# Patient Record
Sex: FEMALE | Race: OTHER | HISPANIC OR LATINO | URBAN - METROPOLITAN AREA
[De-identification: names, ages, dates, MRNs, and addresses within clinical notes are randomized per-mention and may not be internally consistent; named-entity substitution may affect disease eponyms.]

---

## 2018-10-15 PROBLEM — Z00.00 ENCOUNTER FOR PREVENTIVE HEALTH EXAMINATION: Status: ACTIVE | Noted: 2018-10-15

## 2018-10-16 ENCOUNTER — OUTPATIENT (OUTPATIENT)
Dept: OUTPATIENT SERVICES | Facility: HOSPITAL | Age: 23
LOS: 1 days | End: 2018-10-16
Payer: COMMERCIAL

## 2018-10-16 ENCOUNTER — APPOINTMENT (OUTPATIENT)
Dept: SLEEP CENTER | Facility: HOSPITAL | Age: 23
End: 2018-10-16

## 2018-10-16 DIAGNOSIS — G47.33 OBSTRUCTIVE SLEEP APNEA (ADULT) (PEDIATRIC): ICD-10-CM

## 2018-10-16 PROCEDURE — 95810 POLYSOM 6/> YRS 4/> PARAM: CPT | Mod: 26

## 2018-10-16 PROCEDURE — 95810 POLYSOM 6/> YRS 4/> PARAM: CPT

## 2022-07-05 ENCOUNTER — EMERGENCY (EMERGENCY)
Facility: HOSPITAL | Age: 27
LOS: 0 days | Discharge: HOME | End: 2022-07-06
Attending: EMERGENCY MEDICINE | Admitting: EMERGENCY MEDICINE

## 2022-07-05 VITALS
SYSTOLIC BLOOD PRESSURE: 111 MMHG | WEIGHT: 263.89 LBS | OXYGEN SATURATION: 99 % | HEART RATE: 88 BPM | DIASTOLIC BLOOD PRESSURE: 58 MMHG | TEMPERATURE: 98 F | RESPIRATION RATE: 18 BRPM

## 2022-07-05 DIAGNOSIS — B34.9 VIRAL INFECTION, UNSPECIFIED: ICD-10-CM

## 2022-07-05 DIAGNOSIS — Z20.822 CONTACT WITH AND (SUSPECTED) EXPOSURE TO COVID-19: ICD-10-CM

## 2022-07-05 DIAGNOSIS — R51.9 HEADACHE, UNSPECIFIED: ICD-10-CM

## 2022-07-05 PROCEDURE — 99284 EMERGENCY DEPT VISIT MOD MDM: CPT

## 2022-07-05 RX ORDER — ACETAMINOPHEN 500 MG
650 TABLET ORAL ONCE
Refills: 0 | Status: COMPLETED | OUTPATIENT
Start: 2022-07-05 | End: 2022-07-05

## 2022-07-05 RX ORDER — ACETAMINOPHEN 500 MG
975 TABLET ORAL ONCE
Refills: 0 | Status: COMPLETED | OUTPATIENT
Start: 2022-07-05 | End: 2022-07-05

## 2022-07-05 RX ADMIN — Medication 975 MILLIGRAM(S): at 23:10

## 2022-07-05 NOTE — ED PROVIDER NOTE - CLINICAL SUMMARY MEDICAL DECISION MAKING FREE TEXT BOX
26-year-old female presents ED for viral symptoms concerning for COVID.  Patient's vitals within normal limits.  Patient swabbed for COVID results pending DC home strict return precautions.

## 2022-07-05 NOTE — ED PROVIDER NOTE - NS ED ROS FT
Review of Systems:  	•	CONSTITUTIONAL - no fever, no diaphoresis, no chills  	•	SKIN - no rash  	•	HEMATOLOGIC - no bleeding, no bruising  	•	EYES - no eye pain, no blurry vision  	•	ENT - +rhinorrhea, no congestion  	•	RESPIRATORY - no shortness of breath, no cough  	•	CARDIAC - no chest pain, no palpitations  	•	GI - no abd pain, no nausea, no vomiting, no diarrhea, no constipation  	•	GENITO-URINARY - no dysuria; no hematuria, no increased urinary frequency  	•	MUSCULOSKELETAL - no joint paint, no swelling, no redness  	•	NEUROLOGIC - no weakness, + headache, no paresthesias, no LOC  	•	PSYCH - no anxiety, no depression  	All other ROS are negative except as documented in HPI.

## 2022-07-05 NOTE — ED PROVIDER NOTE - NS ED ATTENDING STATEMENT MOD
This was a shared visit with the WALLY. I reviewed and verified the documentation and independently performed the documented:

## 2022-07-05 NOTE — ED PROVIDER NOTE - OBJECTIVE STATEMENT
25 yo F presenting for evaluation of 1 day of headache, rhinorrhea, and not feeling well. States her  just got tested positive for COVID. No cp, sob, fever, chills, abdominal pain, nausea, vomiting, diarrhea, back pain, urinary symptoms, dizziness, paresthesias, or weakness.

## 2022-07-05 NOTE — ED PROVIDER NOTE - PATIENT PORTAL LINK FT
You can access the FollowMyHealth Patient Portal offered by [facility] by registering at the following website: [email or web address]. By joining [employer]’s FollowMyHealth portal, you will also be able to view your health information using other applications (apps) compatible with our system.

## 2022-07-05 NOTE — ED ADULT TRIAGE NOTE - CHIEF COMPLAINT QUOTE
pt sts I was exposed to someone with covid and now I have stuffy nose and I feel like a have a fever.

## 2022-07-05 NOTE — ED PROVIDER NOTE - ATTENDING APP SHARED VISIT CONTRIBUTION OF CARE
26-year-old female presents to ED for 1 day of viral symptoms.  Patient states she has cough and rhinorrhea.  Patient's  is positive for COVID.  Patient COVID vaccinated.  No shortness of breath no chest pain or nausea vomiting diarrhea.    Const: NAD  Eyes: PERRL, no conjunctival injection  HENT:  Neck supple without meningismus   CV: RRR, Warm, well-perfused extremities  RESP: CTA B/L, no tachypnea   GI: soft, non-tender, non-distended  MSK: No gross deformities appreciated  Skin: Warm, dry. No rashes  Neuro: Alert, CNs II-XII grossly intact. Sensation and motor function of extremities grossly intact.  Psych: Appropriate mood and affect.    will swab for COVID and give tylenol

## 2022-07-06 LAB — SARS-COV-2 RNA SPEC QL NAA+PROBE: SIGNIFICANT CHANGE UP

## 2022-07-06 NOTE — ED ADULT NURSE NOTE - NSFALLRSKPASTHIST_ED_ALL_ED
H&P reviewed. The patient was examined and there are no changes to the H&P.        
  H&P reviewed. The patient was examined and there are no changes to the H&P.        
no

## 2022-07-09 ENCOUNTER — EMERGENCY (EMERGENCY)
Facility: HOSPITAL | Age: 27
LOS: 0 days | Discharge: HOME | End: 2022-07-09
Attending: STUDENT IN AN ORGANIZED HEALTH CARE EDUCATION/TRAINING PROGRAM | Admitting: STUDENT IN AN ORGANIZED HEALTH CARE EDUCATION/TRAINING PROGRAM

## 2022-07-09 ENCOUNTER — TRANSCRIPTION ENCOUNTER (OUTPATIENT)
Age: 27
End: 2022-07-09

## 2022-07-09 VITALS
HEIGHT: 63 IN | RESPIRATION RATE: 18 BRPM | TEMPERATURE: 98 F | SYSTOLIC BLOOD PRESSURE: 109 MMHG | OXYGEN SATURATION: 99 % | DIASTOLIC BLOOD PRESSURE: 65 MMHG | WEIGHT: 263.89 LBS | HEART RATE: 90 BPM

## 2022-07-09 DIAGNOSIS — Z3A.15 15 WEEKS GESTATION OF PREGNANCY: ICD-10-CM

## 2022-07-09 DIAGNOSIS — R05.8 OTHER SPECIFIED COUGH: ICD-10-CM

## 2022-07-09 DIAGNOSIS — O30.002 TWIN PREGNANCY, UNSPECIFIED NUMBER OF PLACENTA AND UNSPECIFIED NUMBER OF AMNIOTIC SACS, SECOND TRIMESTER: ICD-10-CM

## 2022-07-09 DIAGNOSIS — J02.9 ACUTE PHARYNGITIS, UNSPECIFIED: ICD-10-CM

## 2022-07-09 DIAGNOSIS — O98.512 OTHER VIRAL DISEASES COMPLICATING PREGNANCY, SECOND TRIMESTER: ICD-10-CM

## 2022-07-09 DIAGNOSIS — O99.891 OTHER SPECIFIED DISEASES AND CONDITIONS COMPLICATING PREGNANCY: ICD-10-CM

## 2022-07-09 DIAGNOSIS — U07.1 COVID-19: ICD-10-CM

## 2022-07-09 LAB — SARS-COV-2 RNA SPEC QL NAA+PROBE: DETECTED

## 2022-07-09 PROCEDURE — 99284 EMERGENCY DEPT VISIT MOD MDM: CPT

## 2022-07-09 RX ORDER — ONDANSETRON 8 MG/1
4 TABLET, FILM COATED ORAL ONCE
Refills: 0 | Status: COMPLETED | OUTPATIENT
Start: 2022-07-09 | End: 2022-07-09

## 2022-07-09 RX ORDER — ACETAMINOPHEN 500 MG
975 TABLET ORAL EVERY 6 HOURS
Refills: 0 | Status: DISCONTINUED | OUTPATIENT
Start: 2022-07-09 | End: 2022-07-09

## 2022-07-09 RX ORDER — ACETAMINOPHEN 500 MG
975 TABLET ORAL ONCE
Refills: 0 | Status: COMPLETED | OUTPATIENT
Start: 2022-07-09 | End: 2022-07-09

## 2022-07-09 RX ADMIN — Medication 975 MILLIGRAM(S): at 08:38

## 2022-07-09 RX ADMIN — Medication 975 MILLIGRAM(S): at 09:37

## 2022-07-09 RX ADMIN — ONDANSETRON 4 MILLIGRAM(S): 8 TABLET, FILM COATED ORAL at 08:39

## 2022-07-09 NOTE — ED PROVIDER NOTE - PATIENT PORTAL LINK FT
You can access the FollowMyHealth Patient Portal offered by [facility] by registering at the following website: [email or web address]. By joining [employer]’s FollowMyHealth portal, you will also be able to view your health information using other applications (apps) compatible with our system.

## 2022-07-09 NOTE — ED PROVIDER NOTE - DOMESTIC TRAVEL HIGH RISK QUESTION
Quality 226: Preventive Care And Screening: Tobacco Use: Screening And Cessation Intervention: Patient screened for tobacco use and is an ex/non-smoker Quality 130: Documentation Of Current Medications In The Medical Record: Current Medications Documented Detail Level: Detailed Quality 431: Preventive Care And Screening: Unhealthy Alcohol Use - Screening: Patient screened for unhealthy alcohol use using a single question and scores less than 2 times per year No

## 2022-07-09 NOTE — ED PROVIDER NOTE - PHYSICAL EXAMINATION
CONSTITUTIONAL: Well-appearing; well-nourished; in no apparent distress.   EYES: PERRL; EOM intact.   NECK: Supple; non-tender; no cervical lymphadenopathy.   CARDIOVASCULAR: Normal S1, S2; no murmurs, rubs, or gallops.   RESPIRATORY: Normal chest excursion with respiration; breath sounds clear and equal bilaterally; no wheezes, rhonchi, or rales.  GI/: Normal bowel sounds; non-distended; non-tender; no palpable organomegaly.   MS: No evidence of trauma or deformity.  Normal ROM in all four extremities; non-tender to palpation; distal pulses are normal.   SKIN: Normal for age and race; warm; dry; good turgor; no apparent lesions or exudate.   NEURO/PSYCH: A & O x 4; grossly unremarkable. mood and manner are appropriate.

## 2022-07-09 NOTE — ED PROVIDER NOTE - NS ED ROS FT
Constitutional: + body aches and weakness. no fever, chills  Eyes: no redness/discharge/pain/vision changes  ENT: + sore throat, nasal congestion, sinus pressure  Cardiac: No chest pain, SOB or edema.  Respiratory: + cough.  respiratory distress  GI: + nausea. No vomiting, diarrhea or abdominal pain.  : No dysuria, frequency, urgency or hematuria  MS: no pain to back or extremities, no loss of ROM, no weakness  Neuro: No headache or weakness. No LOC.  Skin: No skin rash.  Endocrine: No history of thyroid disease or diabetes.  Except as documented in the HPI, all other systems are negative.

## 2022-07-09 NOTE — ED PROVIDER NOTE - OBJECTIVE STATEMENT
26 year old F 15 weeks pregnant with twins c/o 1 day of dry cough, nasal congestion, sinus pressure, sore throat and nausea. Pts  recently tested covid +  Pt had neg covid test 07/05 in ed. Pt denies any fever, chest pain, sob, abd pain, vomiting, inability to tolerate po, had, diarrhea, vaginal bleeding, urinary symptoms.

## 2022-07-09 NOTE — ED ADULT NURSE NOTE - OBJECTIVE STATEMENT
Pt states she is 15 weeks pregnant. C/o body aches, dizziness, nausea, stuffy nose. Pt also states "my head hurts hurt and my eyes feel really hot"  Patient states  is COVID positive

## 2022-07-09 NOTE — ED PROVIDER NOTE - CLINICAL SUMMARY MEDICAL DECISION MAKING FREE TEXT BOX
.    26-year-old pregnant female at about 15 weeks p/w cough, nasal congestion, sore throat, nausea x2 to 3 days.  + Sick contact  with COVID.  No fever, chest pain shortness of breath, abdominal pain.  No vaginal bleeding, urinary symptoms or abdominal pain.    ROS PE above patient is well-appearing, no respiratory distress, lungs clear bilaterally.    Ultrasound shows positive IUP, .  COVID swab sent.  Impression viral illness, likely COVID 19.  Pt stable for dc w/ continued oupt w/up, pmd f/up, and care as discussed.  Pt understands plan and signs and symptoms for ED return. DC home.      .

## 2022-07-09 NOTE — ED ADULT TRIAGE NOTE - CHIEF COMPLAINT QUOTE
Im 15 weeks pregnant and I feel very sick, my body hurts, I feels dizzy, nauseous, my nose is stuffy, my head hurts hurt and my eyes feel really hot -   Patient's  is COVID positive, tested negative PCR three days prior, and negative rapid yesterday

## 2022-08-15 ENCOUNTER — OUTPATIENT (OUTPATIENT)
Dept: OUTPATIENT SERVICES | Facility: HOSPITAL | Age: 27
LOS: 1 days | Discharge: HOME | End: 2022-08-15

## 2022-08-31 ENCOUNTER — EMERGENCY (EMERGENCY)
Facility: HOSPITAL | Age: 27
LOS: 0 days | Discharge: HOME | End: 2022-08-31
Attending: EMERGENCY MEDICINE | Admitting: EMERGENCY MEDICINE

## 2022-08-31 VITALS
OXYGEN SATURATION: 98 % | DIASTOLIC BLOOD PRESSURE: 52 MMHG | WEIGHT: 263.89 LBS | SYSTOLIC BLOOD PRESSURE: 107 MMHG | HEART RATE: 70 BPM | HEIGHT: 63 IN | TEMPERATURE: 98 F | RESPIRATION RATE: 22 BRPM

## 2022-08-31 DIAGNOSIS — R42 DIZZINESS AND GIDDINESS: ICD-10-CM

## 2022-08-31 DIAGNOSIS — O30.002 TWIN PREGNANCY, UNSPECIFIED NUMBER OF PLACENTA AND UNSPECIFIED NUMBER OF AMNIOTIC SACS, SECOND TRIMESTER: ICD-10-CM

## 2022-08-31 DIAGNOSIS — Z3A.00 WEEKS OF GESTATION OF PREGNANCY NOT SPECIFIED: ICD-10-CM

## 2022-08-31 DIAGNOSIS — O21.9 VOMITING OF PREGNANCY, UNSPECIFIED: ICD-10-CM

## 2022-08-31 DIAGNOSIS — R11.0 NAUSEA: ICD-10-CM

## 2022-08-31 DIAGNOSIS — R53.83 OTHER FATIGUE: ICD-10-CM

## 2022-08-31 DIAGNOSIS — O99.891 OTHER SPECIFIED DISEASES AND CONDITIONS COMPLICATING PREGNANCY: ICD-10-CM

## 2022-08-31 LAB
ALBUMIN SERPL ELPH-MCNC: 3.5 G/DL — SIGNIFICANT CHANGE UP (ref 3.5–5.2)
ALP SERPL-CCNC: 72 U/L — SIGNIFICANT CHANGE UP (ref 30–115)
ALT FLD-CCNC: 23 U/L — SIGNIFICANT CHANGE UP (ref 0–41)
ANION GAP SERPL CALC-SCNC: 9 MMOL/L — SIGNIFICANT CHANGE UP (ref 7–14)
APPEARANCE UR: ABNORMAL
APPEARANCE UR: ABNORMAL
AST SERPL-CCNC: 27 U/L — SIGNIFICANT CHANGE UP (ref 0–41)
BACTERIA # UR AUTO: ABNORMAL
BACTERIA # UR AUTO: ABNORMAL
BASOPHILS # BLD AUTO: 0.03 K/UL — SIGNIFICANT CHANGE UP (ref 0–0.2)
BASOPHILS NFR BLD AUTO: 0.4 % — SIGNIFICANT CHANGE UP (ref 0–1)
BILIRUB DIRECT SERPL-MCNC: <0.2 MG/DL — SIGNIFICANT CHANGE UP (ref 0–0.3)
BILIRUB INDIRECT FLD-MCNC: SIGNIFICANT CHANGE UP MG/DL (ref 0.2–1.2)
BILIRUB SERPL-MCNC: <0.2 MG/DL — SIGNIFICANT CHANGE UP (ref 0.2–1.2)
BILIRUB SERPL-MCNC: <0.2 MG/DL — SIGNIFICANT CHANGE UP (ref 0.2–1.2)
BILIRUB UR-MCNC: NEGATIVE — SIGNIFICANT CHANGE UP
BILIRUB UR-MCNC: NEGATIVE — SIGNIFICANT CHANGE UP
BLD GP AB SCN SERPL QL: SIGNIFICANT CHANGE UP
BUN SERPL-MCNC: 7 MG/DL — LOW (ref 10–20)
CALCIUM SERPL-MCNC: 8.8 MG/DL — SIGNIFICANT CHANGE UP (ref 8.5–10.1)
CHLORIDE SERPL-SCNC: 102 MMOL/L — SIGNIFICANT CHANGE UP (ref 98–110)
CO2 SERPL-SCNC: 22 MMOL/L — SIGNIFICANT CHANGE UP (ref 17–32)
COLOR SPEC: YELLOW — SIGNIFICANT CHANGE UP
COLOR SPEC: YELLOW — SIGNIFICANT CHANGE UP
CREAT SERPL-MCNC: 0.5 MG/DL — LOW (ref 0.7–1.5)
DIFF PNL FLD: NEGATIVE — SIGNIFICANT CHANGE UP
DIFF PNL FLD: NEGATIVE — SIGNIFICANT CHANGE UP
EGFR: 132 ML/MIN/1.73M2 — SIGNIFICANT CHANGE UP
EOSINOPHIL # BLD AUTO: 0.14 K/UL — SIGNIFICANT CHANGE UP (ref 0–0.7)
EOSINOPHIL NFR BLD AUTO: 1.8 % — SIGNIFICANT CHANGE UP (ref 0–8)
EPI CELLS # UR: 22 /HPF — HIGH (ref 0–5)
EPI CELLS # UR: 26 /HPF — HIGH (ref 0–5)
GLUCOSE SERPL-MCNC: 68 MG/DL — LOW (ref 70–99)
GLUCOSE UR QL: NEGATIVE — SIGNIFICANT CHANGE UP
GLUCOSE UR QL: NEGATIVE — SIGNIFICANT CHANGE UP
HCT VFR BLD CALC: 32.8 % — LOW (ref 37–47)
HGB BLD-MCNC: 11.3 G/DL — LOW (ref 12–16)
HYALINE CASTS # UR AUTO: 1 /LPF — SIGNIFICANT CHANGE UP (ref 0–7)
HYALINE CASTS # UR AUTO: 2 /LPF — SIGNIFICANT CHANGE UP (ref 0–7)
IMM GRANULOCYTES NFR BLD AUTO: 0.4 % — HIGH (ref 0.1–0.3)
KETONES UR-MCNC: NEGATIVE — SIGNIFICANT CHANGE UP
KETONES UR-MCNC: SIGNIFICANT CHANGE UP
LEUKOCYTE ESTERASE UR-ACNC: ABNORMAL
LEUKOCYTE ESTERASE UR-ACNC: NEGATIVE — SIGNIFICANT CHANGE UP
LIDOCAIN IGE QN: 22 U/L — SIGNIFICANT CHANGE UP (ref 7–60)
LYMPHOCYTES # BLD AUTO: 2.31 K/UL — SIGNIFICANT CHANGE UP (ref 1.2–3.4)
LYMPHOCYTES # BLD AUTO: 29.6 % — SIGNIFICANT CHANGE UP (ref 20.5–51.1)
MCHC RBC-ENTMCNC: 29.3 PG — SIGNIFICANT CHANGE UP (ref 27–31)
MCHC RBC-ENTMCNC: 34.5 G/DL — SIGNIFICANT CHANGE UP (ref 32–37)
MCV RBC AUTO: 85 FL — SIGNIFICANT CHANGE UP (ref 81–99)
MONOCYTES # BLD AUTO: 0.61 K/UL — HIGH (ref 0.1–0.6)
MONOCYTES NFR BLD AUTO: 7.8 % — SIGNIFICANT CHANGE UP (ref 1.7–9.3)
NEUTROPHILS # BLD AUTO: 4.69 K/UL — SIGNIFICANT CHANGE UP (ref 1.4–6.5)
NEUTROPHILS NFR BLD AUTO: 60 % — SIGNIFICANT CHANGE UP (ref 42.2–75.2)
NITRITE UR-MCNC: NEGATIVE — SIGNIFICANT CHANGE UP
NITRITE UR-MCNC: NEGATIVE — SIGNIFICANT CHANGE UP
NRBC # BLD: 0 /100 WBCS — SIGNIFICANT CHANGE UP (ref 0–0)
PH UR: 6.5 — SIGNIFICANT CHANGE UP (ref 5–8)
PH UR: 6.5 — SIGNIFICANT CHANGE UP (ref 5–8)
PLATELET # BLD AUTO: 243 K/UL — SIGNIFICANT CHANGE UP (ref 130–400)
POTASSIUM SERPL-MCNC: 3.4 MMOL/L — LOW (ref 3.5–5)
POTASSIUM SERPL-SCNC: 3.4 MMOL/L — LOW (ref 3.5–5)
PROT SERPL-MCNC: 6 G/DL — SIGNIFICANT CHANGE UP (ref 6–8)
PROT UR-MCNC: NEGATIVE — SIGNIFICANT CHANGE UP
PROT UR-MCNC: SIGNIFICANT CHANGE UP
RBC # BLD: 3.86 M/UL — LOW (ref 4.2–5.4)
RBC # FLD: 12.2 % — SIGNIFICANT CHANGE UP (ref 11.5–14.5)
RBC CASTS # UR COMP ASSIST: 2 /HPF — SIGNIFICANT CHANGE UP (ref 0–4)
RBC CASTS # UR COMP ASSIST: 2 /HPF — SIGNIFICANT CHANGE UP (ref 0–4)
SODIUM SERPL-SCNC: 133 MMOL/L — LOW (ref 135–146)
SP GR SPEC: 1.01 — SIGNIFICANT CHANGE UP (ref 1.01–1.03)
SP GR SPEC: 1.01 — SIGNIFICANT CHANGE UP (ref 1.01–1.03)
UROBILINOGEN FLD QL: SIGNIFICANT CHANGE UP
UROBILINOGEN FLD QL: SIGNIFICANT CHANGE UP
WBC # BLD: 7.81 K/UL — SIGNIFICANT CHANGE UP (ref 4.8–10.8)
WBC # FLD AUTO: 7.81 K/UL — SIGNIFICANT CHANGE UP (ref 4.8–10.8)
WBC UR QL: 11 /HPF — HIGH (ref 0–5)
WBC UR QL: 7 /HPF — HIGH (ref 0–5)

## 2022-08-31 PROCEDURE — 99285 EMERGENCY DEPT VISIT HI MDM: CPT

## 2022-08-31 PROCEDURE — 93010 ELECTROCARDIOGRAM REPORT: CPT

## 2022-08-31 RX ORDER — SODIUM CHLORIDE 9 MG/ML
1000 INJECTION INTRAMUSCULAR; INTRAVENOUS; SUBCUTANEOUS ONCE
Refills: 0 | Status: COMPLETED | OUTPATIENT
Start: 2022-08-31 | End: 2022-08-31

## 2022-08-31 RX ORDER — ONDANSETRON 8 MG/1
1 TABLET, FILM COATED ORAL
Qty: 9 | Refills: 0
Start: 2022-08-31 | End: 2022-09-02

## 2022-08-31 RX ORDER — METOCLOPRAMIDE HCL 10 MG
10 TABLET ORAL ONCE
Refills: 0 | Status: COMPLETED | OUTPATIENT
Start: 2022-08-31 | End: 2022-08-31

## 2022-08-31 RX ADMIN — SODIUM CHLORIDE 1000 MILLILITER(S): 9 INJECTION INTRAMUSCULAR; INTRAVENOUS; SUBCUTANEOUS at 05:07

## 2022-08-31 RX ADMIN — Medication 104 MILLIGRAM(S): at 05:06

## 2022-08-31 NOTE — ED PROVIDER NOTE - CARE PROVIDER_API CALL
ObGyn,   your private ObGyn  Phone: (   )    -  Fax: (   )    -  Established Patient  Follow Up Time: Routine

## 2022-08-31 NOTE — ED PROVIDER NOTE - NS ED ATTENDING STATEMENT MOD
Patient transferred to [facility] via ambulance. Report given to SRAVANTHI William.    This was a shared visit with the WALLY. I reviewed and verified the documentation and independently performed the documented:

## 2022-08-31 NOTE — ED PROVIDER NOTE - PROVIDER TOKENS
FREE:[LAST:[ObGyn],PHONE:[(   )    -],FAX:[(   )    -],ADDRESS:[your private ObGyn],FOLLOWUP:[Routine],ESTABLISHEDPATIENT:[T]]

## 2022-08-31 NOTE — ED PROVIDER NOTE - NSFOLLOWUPINSTRUCTIONS_ED_ALL_ED_FT
Please follow up with your OBGYN outpatient. Please return to the ED if you develop worsening nausea/vomiting, dizziness, or other new/concerning symptoms such as abdominal pain, vaginal bleeding etc.     Hyperemesis Gravidarum  Hyperemesis gravidarum is a severe form of nausea and vomiting that happens during pregnancy. Hyperemesis is worse than morning sickness. It may cause you to have nausea or vomiting all day for many days. It may keep you from eating and drinking enough food and liquids, which can lead to dehydration, malnutrition, and weight loss. Hyperemesis usually occurs during the first half (the first 20 weeks) of pregnancy. It often goes away once a woman is in her second half of pregnancy. However, sometimes hyperemesis continues through an entire pregnancy.    What are the causes?  The cause of this condition is not known. It may be related to changes in chemicals (hormones) in the body during pregnancy, such as the high level of pregnancy hormone (human chorionic gonadotropin) or the increase in the female sex hormone (estrogen).    What are the signs or symptoms?  Symptoms of this condition include:  Nausea that does not go away.  Vomiting that does not allow you to keep any food down.  Weight loss.  Body fluid loss (dehydration).  Having no desire to eat, or not liking food that you have previously enjoyed.  How is this diagnosed?  This condition may be diagnosed based on:  A physical exam.  Your medical history.  Your symptoms.  Blood tests.  Urine tests.  How is this treated?  This condition is managed by controlling symptoms. This may include:  Following an eating plan. This can help lessen nausea and vomiting.  Taking prescription medicines.  An eating plan and medicines are often used together to help control symptoms. If medicines do not help relieve nausea and vomiting, you may need to receive fluids through an IV at the hospital.    Follow these instructions at home:  Eating and drinking     Avoid the following:  Drinking fluids with meals. Try not to drink anything during the 30 minutes before and after your meals.  Drinking more than 1 cup of fluid at a time.  Eating foods that trigger your symptoms. These may include spicy foods, coffee, high-fat foods, very sweet foods, and acidic foods.  Skipping meals. Nausea can be more intense on an empty stomach. If you cannot tolerate food, do not force it. Try sucking on ice chips or other frozen items and make up for missed calories later.  Lying down within 2 hours after eating.  Being exposed to environmental triggers. These may include food smells, smoky rooms, closed spaces, rooms with strong smells, warm or humid places, overly loud and noisy rooms, and rooms with motion or flickering lights. Try eating meals in a well-ventilated area that is free of strong smells.  Quick and sudden changes in your movement.  Taking iron pills and multivitamins that contain iron. If you take prescription iron pills, do not stop taking them unless your health care provider approves.  Preparing food. The smell of food can spoil your appetite or trigger nausea.  To help relieve your symptoms:  Listen to your body. Everyone is different and has different preferences. Find what works best for you.  Eat and drink slowly.  Eat 5–6 small meals daily instead of 3 large meals. Eating small meals and snacks can help you avoid an empty stomach.  In the morning, before getting out of bed, eat a couple of crackers to avoid moving around on an empty stomach.  Try eating starchy foods as these are usually tolerated well. Examples include cereal, toast, bread, potatoes, pasta, rice, and pretzels.  Include at least 1 serving of protein with your meals and snacks. Protein options include lean meats, poultry, seafood, beans, nuts, nut butters, eggs, cheese, and yogurt.  Try eating a protein-rich snack before bed. Examples of a protein-katie snack include cheese and crackers or a peanut butter sandwich made with 1 slice of whole-wheat bread and 1 tsp (5 g) of peanut butter.  Eat or suck on things that have ginger in them. It may help relieve nausea. Add ¼ tsp ground ginger to hot tea or choose ginger tea.  Try drinking 100% fruit juice or an electrolyte drink. An electrolyte drink contains sodium, potassium, and chloride.  Drink fluids that are cold, clear, and carbonated or sour. Examples include lemonade, ginger ale, lemon–lime soda, ice water, and sparkling water.  Brush your teeth or use a mouth rinse after meals.  Talk with your health care provider about starting a supplement of vitamin B6.  General instructions     Take over-the-counter and prescription medicines only as told by your health care provider.  Follow instructions from your health care provider about eating or drinking restrictions.  Continue to take your prenatal vitamins as told by your health care provider. If you are having trouble taking your prenatal vitamins, talk with your health care provider about different options.  Keep all follow-up and pre-birth (prenatal) visits as told by your health care provider. This is important.  Contact a health care provider if:  You have pain in your abdomen.  You have a severe headache.  You have vision problems.  You are losing weight.  You feel weak or dizzy.  Get help right away if:  You cannot drink fluids without vomiting.  You vomit blood.  You have constant nausea and vomiting.  You are very weak.  You faint.  You have a fever and your symptoms suddenly get worse.  Summary  Hyperemesis gravidarum is a severe form of nausea and vomiting that happens during pregnancy.  Making some changes to your eating habits may help relieve nausea and vomiting.  This condition may be managed with medicine.  If medicines do not help relieve nausea and vomiting, you may need to receive fluids through an IV at the hospital.  This information is not intended to replace advice given to you by your health care provider. Make sure you discuss any questions you have with your health care provider.

## 2022-08-31 NOTE — ED PROVIDER NOTE - CLINICAL SUMMARY MEDICAL DECISION MAKING FREE TEXT BOX
nausea, lightheadedness in pregnancy - ekg, labs nml, bedside pelvic US +fhrx2, sx improved w/reglan - all results d/w pt & copies given, strict return precautions discussed, Rx zofran, rec close/continnued op ObGyn f/u

## 2022-08-31 NOTE — ED PROVIDER NOTE - NS ED ROS FT
Constitutional: (-) fever  Eyes/ENT: (-) blurry vision, (-) epistaxis  Cardiovascular: (-) chest pain, (-) syncope  Respiratory: (-) cough, (-) shortness of breath  Gastrointestinal: (+) nausea (-) vomiting, (-) diarrhea  Musculoskeletal: (-) neck pain, (-) back pain, (-) joint pain  Integumentary: (-) rash, (-) edema  Neurological: (-) headache, (-) altered mental status (+) dizziness/lightheadedness  Psychiatric: (-) hallucinations  Allergic/Immunologic: (-) pruritus

## 2022-08-31 NOTE — ED PROVIDER NOTE - PATIENT PORTAL LINK FT
You can access the FollowMyHealth Patient Portal offered by [facility] by registering at the following website: [email or web address]. By joining [facility]’s FollowMyHealth portal, you will also be able to view your health information using other applications (apps) compatible with our system.

## 2022-08-31 NOTE — ED PROVIDER NOTE - ATTENDING APP SHARED VISIT CONTRIBUTION OF CARE
26F  twin gestation @ ega 5 mos (lmp 3/24/22) p/w nausea & lightheadedness x few days. Rpts similar sx felt intermittently throughout pregnancy, has tried vit B6 as prescribed by her obgyn w/min relief. No ha, vision changes, cp/simental, abd pain, vag bleeding or discharge, flank pain, urinary sx, rash.    PE:  nad  skin warm, dry  ncat  neck supple  rrr nl s1s2 no mrg  ctab no wrr  abd gravid soft ntnd no palpable masses no rgr  back non-tender no cvat  ext no cce dpi  neuro aaox3 grossly nf exam

## 2022-08-31 NOTE — ED PROVIDER NOTE - OBJECTIVE STATEMENT
26 year old female  currently 5 months pregnant with twins LMP  26 year old female  currently 5 months pregnant with twins LMP  presents to the ED with nausea and dizziness. 26 year old female  twin gestation with estimated gestational age of 5 months (LMP 3/24/22) presents to the ED with nausea & lightheadedness for the past few days. Patient states that she experienced similar symptoms early on in her pregnancy although not as of recent. Denies fever, chills, abdominal pain, vomiting, urinary symptoms and vaginal bleeding/discharge.

## 2022-09-01 LAB
CULTURE RESULTS: SIGNIFICANT CHANGE UP
CULTURE RESULTS: SIGNIFICANT CHANGE UP
SPECIMEN SOURCE: SIGNIFICANT CHANGE UP
SPECIMEN SOURCE: SIGNIFICANT CHANGE UP

## 2023-05-23 ENCOUNTER — EMERGENCY (EMERGENCY)
Facility: HOSPITAL | Age: 28
LOS: 0 days | Discharge: HOME | End: 2023-05-23
Attending: EMERGENCY MEDICINE
Payer: MEDICAID

## 2023-05-23 VITALS
DIASTOLIC BLOOD PRESSURE: 73 MMHG | HEIGHT: 63 IN | SYSTOLIC BLOOD PRESSURE: 130 MMHG | OXYGEN SATURATION: 98 % | HEART RATE: 91 BPM | RESPIRATION RATE: 18 BRPM | TEMPERATURE: 99 F | WEIGHT: 270.07 LBS

## 2023-05-23 DIAGNOSIS — Z20.822 CONTACT WITH AND (SUSPECTED) EXPOSURE TO COVID-19: ICD-10-CM

## 2023-05-23 DIAGNOSIS — M25.531 PAIN IN RIGHT WRIST: ICD-10-CM

## 2023-05-23 LAB — SARS-COV-2 RNA SPEC QL NAA+PROBE: SIGNIFICANT CHANGE UP

## 2023-05-23 PROCEDURE — 87635 SARS-COV-2 COVID-19 AMP PRB: CPT

## 2023-05-23 PROCEDURE — 96372 THER/PROPH/DIAG INJ SC/IM: CPT

## 2023-05-23 PROCEDURE — 99284 EMERGENCY DEPT VISIT MOD MDM: CPT

## 2023-05-23 PROCEDURE — 99283 EMERGENCY DEPT VISIT LOW MDM: CPT | Mod: 25

## 2023-05-23 RX ORDER — KETOROLAC TROMETHAMINE 30 MG/ML
60 SYRINGE (ML) INJECTION ONCE
Refills: 0 | Status: DISCONTINUED | OUTPATIENT
Start: 2023-05-23 | End: 2023-05-23

## 2023-05-23 RX ORDER — DEXAMETHASONE 0.5 MG/5ML
10 ELIXIR ORAL ONCE
Refills: 0 | Status: COMPLETED | OUTPATIENT
Start: 2023-05-23 | End: 2023-05-23

## 2023-05-23 RX ADMIN — Medication 10 MILLIGRAM(S): at 10:00

## 2023-05-23 RX ADMIN — Medication 60 MILLIGRAM(S): at 10:02

## 2023-05-23 NOTE — ED PROVIDER NOTE - PATIENT PORTAL LINK FT
You can access the FollowMyHealth Patient Portal offered by [facility] by registering at the following website: [email or web address]. By joining [employer]’s FollowMyHealth portal, you will also be able to view your health information using other applications (apps) compatible with our system.

## 2023-05-23 NOTE — ED PROVIDER NOTE - ATTENDING CONTRIBUTION TO CARE
27-year-old female to ED with atraumatic wrist pain.  Patient has to use a 5-month-old kids that she is bottlefeeding.  On over the past few days noted increasing pain in tenderness to lateral wrist especially over her ulnar styloid.  No fall no specific bony tenderness no redness.  Pain worse with extension of the wrist and palpation of her lateral wrist.

## 2023-05-23 NOTE — ED PROVIDER NOTE - DIFFERENTIAL DIAGNOSIS
Differential Diagnosis The differential diagnosis for patients clinical presentation includes but is not limited to:  MSK pain.  Unlikely fracture/dislocation due to previous negative XR and H&P  Unlikely infectious etiology or vascular etiology due to H&P

## 2023-05-23 NOTE — ED PROVIDER NOTE - OBJECTIVE STATEMENT
28 yo female presents for worsening R wrist pain.  Has baby that she carries, which worsens pain, pain located in R ulnar styloid area. Had XR performed which was negative.  No fevers, N/T, paralysis. 28 yo female presents for worsening R wrist pain.  Has baby that she carries, which worsens pain, pain located in R ulnar styloid area. Had XR performed which was negative.  No fevers, N/T, paralysis, h/o trauma. 26 yo female presents for worsening R wrist pain.  Has baby that she carries, which worsens pain, pain located in R ulnar styloid area. Had XR performed which was negative.  No fevers, N/T, paralysis, h/o trauma. Pt also requesting COVID swab due to possible sick contact but currently asx.

## 2023-05-23 NOTE — ED PROVIDER NOTE - CLINICAL SUMMARY MEDICAL DECISION MAKING FREE TEXT BOX
Extensive discussion with patient about return precautions.  We will give short course of steroids and NSAIDs and will discharge home with hand follow-up.

## 2023-05-23 NOTE — ED PROVIDER NOTE - NSFOLLOWUPINSTRUCTIONS_ED_ALL_ED_FT
PLEASE TAKE 600 MG OF IBUPROFEN EVERY 6 HOURS    Our Emergency Department Referral Coordinators will be reaching out to you in the next 24-48 hours from 9:00am to 5:00pm with a follow up appointment. Please expect a phone call from the hospital in that time frame. If you do not receive a call or if you have any questions or concerns, you can reach them at   [phone]      Wrist Pain, Adult    There are many things that can cause wrist pain. Some common causes include:    An injury to the wrist area, such as a sprain, strain, or fracture.  Overuse of the joint.  A condition that causes increased pressure on a nerve in the wrist (carpal tunnel syndrome).  Wear and tear of the joints that occurs with aging (osteoarthritis).  A variety of other types of arthritis.    Sometimes, the cause of wrist pain is not known. Often, the pain goes away when you follow instructions from your health care provider for relieving pain at home, such as resting or icing the wrist. If your wrist pain continues, it is important to tell your health care provider.    Follow these instructions at home:  Rest the wrist area for at least 48 hours or as long as told by your health care provider.  If a splint or elastic bandage has been applied, use it as told by your health care provider.    Remove the splint or bandage only as told by your health care provider.  Loosen the splint or bandage if your fingers tingle, become numb, or turn cold or blue.    ImageIf directed, apply ice to the injured area.    If you have a removable splint or elastic bandage, remove it as told by your health care provider.  Put ice in a plastic bag.  Place a towel between your skin and the bag or between your splint or bandage and the bag.  Leave the ice on for 20 minutes, 2–3 times a day.    Keep your arm raised (elevated) above the level of your heart while you are sitting or lying down.  Take over-the-counter and prescription medicines only as told by your health care provider.  Keep all follow-up visits as told by your health care provider. This is important.  Contact a health care provider if:  You have a sudden sharp pain in the wrist, hand, or arm that is different or new.  The swelling or bruising on your wrist or hand gets worse.  Your skin becomes red, gets a rash, or has open sores.  Your pain does not get better or it gets worse.  Get help right away if:  You lose feeling in your fingers or hand.  Your fingers turn white, very red, or cold and blue.  You cannot move your fingers.  You have a fever or chills.  This information is not intended to replace advice given to you by your health care provider. Make sure you discuss any questions you have with your health care provider.

## 2023-05-23 NOTE — ED PROVIDER NOTE - PHYSICAL EXAMINATION
GENERAL: NAD   SKIN: warm, dry  CARD: S1, S2 normal; no murmurs, gallops, or rubs. Regular rate and rhythm. Radial pulse 2+/4 bilaterally   RESP: LCTAB; No wheezes, rales, rhonchi, or stridor.  EXT: TTP to R ulnar styloid.  Median, ulnar, and radial nerves tested and intact in RUE, sensation intact. R wrist strength testing limited by pain.

## 2023-05-23 NOTE — ED PROVIDER NOTE - PROGRESS NOTE DETAILS
Joseph: 26 yo female presents for worsening R wrist pain.  Has baby that she carries, which worsens pain, pain located in R ulnar styloid area. No fevers, N/T, paralysis. Vitals WNL.    GENERAL: NAD   SKIN: warm, dry  CARD: S1, S2 normal; no murmurs, gallops, or rubs. Regular rate and rhythm. Radial pulse 2+/4 bilaterally   RESP: LCTAB; No wheezes, rales, rhonchi, or stridor.  EXT: TTP to R ulnar styloid.  Median, ulnar, and radial nerves tested and intact in RUE, sensation intact. R wrist strength testing limited by pain.    The differential diagnosis for patients clinical presentation includes but is not limited to:  MSK pain.  Unlikely fracture/dislocation due to previous negative XR and H&P  Unlikely infectious etiology or vascular etiology due to H&P  XR was considered but since pt has previous negative XR, no XR performed here. Toradol and Decadron given. Pt placed in ACE wrap. Escalation to admission/observation was considered but not needed at this time. Pt comfortable with DC, will DC with hand surgery and PCP f/u. Opal: 26 yo female presents for worsening R wrist pain.  Has baby that she carries, which worsens pain, pain located in R ulnar styloid area. No fevers, N/T, paralysis, h/o trauma. Vitals WNL.    GENERAL: NAD   SKIN: warm, dry  CARD: S1, S2 normal; no murmurs, gallops, or rubs. Regular rate and rhythm. Radial pulse 2+/4 bilaterally   RESP: LCTAB; No wheezes, rales, rhonchi, or stridor.  EXT: TTP to R ulnar styloid.  Median, ulnar, and radial nerves tested and intact in RUE, sensation intact. R wrist strength testing limited by pain.    The differential diagnosis for patients clinical presentation includes but is not limited to:  MSK pain.  Unlikely fracture/dislocation due to previous negative XR and H&P  Unlikely infectious etiology or vascular etiology due to H&P  XR was considered but since pt has previous negative XR, no XR performed here. Toradol and Decadron given. Pt placed in ACE wrap. Escalation to admission/observation was considered but not needed at this time. Pt comfortable with DC, discussed taking ibuprofen. Strict return precautions given, will DC with hand surgery and PCP f/u. Oapl: 26 yo female presents for worsening R wrist pain.  Has baby that she carries, which worsens pain, pain located in R ulnar styloid area. No fevers, N/T, paralysis, h/o trauma. Pt also requesting COVID swab due to possible sick contact but currently asx.  Vitals WNL.    GENERAL: NAD   SKIN: warm, dry  CARD: S1, S2 normal; no murmurs, gallops, or rubs. Regular rate and rhythm. Radial pulse 2+/4 bilaterally   RESP: LCTAB; No wheezes, rales, rhonchi, or stridor.  EXT: TTP to R ulnar styloid.  Median, ulnar, and radial nerves tested and intact in RUE, sensation intact. R wrist strength testing limited by pain.    The differential diagnosis for patients clinical presentation includes but is not limited to:  MSK pain.  Unlikely fracture/dislocation due to previous negative XR and H&P  Unlikely infectious etiology or vascular etiology due to H&P  XR was considered but since pt has previous negative XR, no XR performed here. Pt swabbed for COVID.  Toradol and Decadron given. Pt placed in ACE wrap. Escalation to admission/observation was considered but not needed at this time. Pt comfortable with DC, discussed taking ibuprofen. Strict return precautions given, will DC with hand surgery and PCP f/u. Opal: 28 yo female presents for worsening R wrist pain.  Has baby that she carries, which worsens pain, pain located in R ulnar styloid area. No fevers, N/T, paralysis, h/o trauma. Pt also requesting COVID swab due to possible sick contact but currently asx.  Vitals WNL.    GENERAL: NAD   SKIN: warm, dry  CARD: S1, S2 normal; no murmurs, gallops, or rubs. Regular rate and rhythm. Radial pulse 2+/4 bilaterally   RESP: LCTAB; No wheezes, rales, rhonchi, or stridor.  EXT: TTP to R ulnar styloid.  Median, ulnar, and radial nerves tested and intact in RUE, sensation intact. R wrist strength testing limited by pain.    The differential diagnosis for patients clinical presentation includes but is not limited to:  MSK pain.  Unlikely fracture/dislocation due to previous negative XR and H&P  Unlikely infectious etiology or vascular etiology due to H&P    XR was considered but since pt has previous negative XR, no XR performed here. Pt swabbed for COVID.  Toradol and Decadron given. Pt placed in ACE wrap. Escalation to admission/observation was considered but not needed at this time. Pt comfortable with DC, discussed taking ibuprofen. Strict return precautions given, will DC with hand surgery and PCP f/u.

## 2023-05-24 ENCOUNTER — NON-APPOINTMENT (OUTPATIENT)
Age: 28
End: 2023-05-24

## 2023-05-24 NOTE — CHART NOTE - NSCHARTNOTEFT_GEN_A_CORE
hand follow up scheduled or 5/25/2023 at 11 am with Dr. Fournier office [address] Pt aware of appt details.

## 2023-05-26 ENCOUNTER — APPOINTMENT (OUTPATIENT)
Dept: ORTHOPEDIC SURGERY | Facility: CLINIC | Age: 28
End: 2023-05-26
Payer: MEDICAID

## 2023-05-29 ENCOUNTER — EMERGENCY (EMERGENCY)
Facility: HOSPITAL | Age: 28
LOS: 0 days | Discharge: HOME | End: 2023-05-30
Attending: EMERGENCY MEDICINE
Payer: MEDICAID

## 2023-05-29 VITALS
HEIGHT: 63 IN | OXYGEN SATURATION: 98 % | TEMPERATURE: 99 F | DIASTOLIC BLOOD PRESSURE: 70 MMHG | WEIGHT: 270.07 LBS | SYSTOLIC BLOOD PRESSURE: 116 MMHG | HEART RATE: 91 BPM | RESPIRATION RATE: 18 BRPM

## 2023-05-29 DIAGNOSIS — M25.531 PAIN IN RIGHT WRIST: ICD-10-CM

## 2023-05-29 PROCEDURE — 73110 X-RAY EXAM OF WRIST: CPT | Mod: 26,RT

## 2023-05-29 PROCEDURE — 73110 X-RAY EXAM OF WRIST: CPT | Mod: RT

## 2023-05-29 PROCEDURE — 96372 THER/PROPH/DIAG INJ SC/IM: CPT

## 2023-05-29 PROCEDURE — 99284 EMERGENCY DEPT VISIT MOD MDM: CPT

## 2023-05-29 PROCEDURE — 99283 EMERGENCY DEPT VISIT LOW MDM: CPT | Mod: 25

## 2023-05-29 RX ORDER — KETOROLAC TROMETHAMINE 30 MG/ML
30 SYRINGE (ML) INJECTION ONCE
Refills: 0 | Status: DISCONTINUED | OUTPATIENT
Start: 2023-05-29 | End: 2023-05-29

## 2023-05-29 RX ADMIN — Medication 30 MILLIGRAM(S): at 23:36

## 2023-05-29 NOTE — ED PROVIDER NOTE - CARE PLAN
Assessment and plan of treatment:	wrist pain  likely soft tissue  imaging, supportive care   1 Principal Discharge DX:	Pain, wrist  Assessment and plan of treatment:	wrist pain  likely soft tissue  imaging, supportive care

## 2023-05-29 NOTE — ED PROVIDER NOTE - DISCHARGE DATE
Results reviewed. Released to [address] Labs normal except for mildly elevated AP and bili. Recommend CT abdomen/pelvis with contrast in addition to scopes. Please notify pt. I've ordered CT. 30-May-2023

## 2023-05-29 NOTE — ED PROVIDER NOTE - OBJECTIVE STATEMENT
27-year-old female with no past medical history who presents with right wrist pain.  Reports that symptoms started several weeks ago; pain is constant and worse with movement.  Reports that she was seen in this ER a few days ago and was given medication and symptom improved.  Reports she has appointment with an orthopedist tomorrow.  Denies recent trauma or injury to her wrist, fever, and appear discomfort elsewhere.

## 2023-05-29 NOTE — ED PROVIDER NOTE - ATTENDING APP SHARED VISIT CONTRIBUTION OF CARE
pt co wrist pain (r) for months. using ace compression and ibu. still hurting when she moves it. no injury. has appt with ortho in 2 days. no fever. no numbness or weaknses.    nad, tender over ulnar collateral lig. joint stable. FROM. nml pulses, cap refill, sensory. skin nml. no lad.

## 2023-05-29 NOTE — ED PROVIDER NOTE - PROGRESS NOTE DETAILS
X-ray unremarkable.  Meds given in ED.  Ace wrap applied.  Patient stable for discharge.  Patient already has appointment with an orthopedist tomorrow.

## 2023-05-29 NOTE — ED ADULT TRIAGE NOTE - CHIEF COMPLAINT QUOTE
c/o right wrist pain ,was seen here last week got little better with the shot but getting progressively worse . Denied any trauma or injury

## 2023-05-29 NOTE — ED PROVIDER NOTE - PHYSICAL EXAMINATION
CONSTITUTIONAL: Well-appearing; in no apparent distress.   ENT: Hearing is intact with good acuity to spoken voice.  Patient is speaking clearly, not muffled and airway is intact.   RESPIRATORY: No signs of respiratory distress.   CARDIOVASCULAR: Regular rate and rhythm.   MS: R wrist with no obvious deformity or swelling; tender to palpation; decreased ROM; sensory function intact; distal pulse present. Rest of the upper and lower extremities unremarkable. Steady gait noted.   NEURO: A & O x 3. Normal speech. No focal deficit.  PSYCHOLOGICAL: Appropriate mood and affect. Good judgement and insight.

## 2023-05-29 NOTE — ED PROVIDER NOTE - PATIENT PORTAL LINK FT
You can access the FollowMyHealth Patient Portal offered by [facility] by registering at the following website: [email or web address]. By joining [facility]’s FollowMyHealth portal, you will also be able to view your health information using other applications (apps) compatible with our system.

## 2023-05-31 ENCOUNTER — APPOINTMENT (OUTPATIENT)
Dept: ORTHOPEDIC SURGERY | Facility: CLINIC | Age: 28
End: 2023-05-31
Payer: MEDICAID

## 2023-05-31 VITALS
DIASTOLIC BLOOD PRESSURE: 81 MMHG | OXYGEN SATURATION: 97 % | BODY MASS INDEX: 47.84 KG/M2 | WEIGHT: 270 LBS | SYSTOLIC BLOOD PRESSURE: 115 MMHG | HEART RATE: 84 BPM | HEIGHT: 63 IN

## 2023-05-31 DIAGNOSIS — F32.A DEPRESSION, UNSPECIFIED: ICD-10-CM

## 2023-05-31 PROCEDURE — 99203 OFFICE O/P NEW LOW 30 MIN: CPT

## 2023-05-31 NOTE — HISTORY OF PRESENT ILLNESS
[de-identified] : MADDI SANTOS is a 27 year old female who presents with right wrist pain.\par Patient is right-hand dominant.\par States the onset of pain was several months ago\par No antecedent trauma or injury. Has not experienced previously.\par Pain is dorsal ulnar aspect \par Radiates distally into hand\par There is associated  swelling, numbness and paraesthesia dorsal and volar aspect\par Exacerbating factors are lifting, grasping, pushing\par Has tried Ibuprofen 800mg, Naproxen and acetaminophen with little effect\par Wearing ACE wrap \par Presented to Urgent Care ([facility]) x2 and ED x2 last was 5/29/23 had XR taken \par Toradol IM in ED worked first visit but not the last visit.\reji Was referred to a Hand Specialist but they did not take her insurance\par Does not exercise regularly. 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

## 2023-05-31 NOTE — PHYSICAL EXAM
[de-identified] : General: Well-nourished, well-developed, alert, and in no acute distress.\par Head: Normocephalic.\par Eyes: Pupils equal, extraocular muscles intact, normal sclera.\par Nose: No nasal discharge.\par Cardiovascular: Extremities are warm and well perfused. Distal pulses are symmetric bilaterally.\par Respiratory: No labored breathing.\par Extremities: Sensation is intact distally bilaterally. Distal pulses are symmetric bilaterally\par Lymphatic: No regional lymphadenopathy, no lymphedema\par Neurologic: No focal deficits\par Skin: Normal skin color, texture, and turgor\par Psychiatric: Normal affect \par \par MSK:\par Examination of right hand and wrist:\par Inspection: no erythema, ecchymosis, deformity, atrophy, scars, skin or nail changes. \par Tender to palpation: TFCC\par Nontender to palpation: scaphoid, scapholunate ligament, lunotriquetral ligament,  pisotriquetral joint, hook of hamate, CMC joint, flexor retinaculum, ulnar styloid \par ROM: wrist flexion 70 deg, wrist extension 50 deg, ulnar deviation 30 deg, radial deviation 20 deg, MCP flex 90 deg, pronation 90 deg, supination 90 deg\par Pain at end of ROM\par \par Special tests:\par 1st CMC grind negative\par Finkelstein's negative (pain dorsal ulnar)\par TFCC compression positive\par Gn negative\par Tinel negative\par Durkan positive\par Phalen pain without paraesthesia\par Thumbs up (radial), fist (median), peace (ulnar), A-OK (AIN inn [employer]) intact\par \par Examination of left hand and wrist:\par Inspection: no erythema, ecchymosis, deformity, atrophy, scars, skin or nail changes. \par Nontender to palpation: scaphoid, scapholunate ligament, lunotriquetral ligament,  pisotriquetral joint, hook of hamate, CMC joint, dorsal wrist compartments, flexor retinaculum, radial ulnar joint or TFCC \par ROM: full wrist flexion 90 deg, wrist extension 70 deg, ulnar deviation 50 deg, radial deviation 20 deg, MCP flex 90 deg, pronation 90 deg, supination 90 deg\par \par Sensation is intact to light touch over the axillary, musculocutaneous, median, radial, and ulnar nerve distributions bilaterally. Capillary refill is less than two seconds. Radial pulses 2+ equal bilaterally. Strength testing shows 5/5 abduction, 5/5 external rotation, 5/5 internal rotation, 5/5 biceps, 5/5 triceps. 5/5 wrist extension, 5/5 intrinsics. \par Reflexes 2+ biceps, brachioradialis. Vasquez negative.  [de-identified] : XR right wrist (5/29/23): There is no evidence of fracture or dislocation. Normal carpal alignment. The joint spaces are preserved.

## 2023-06-22 ENCOUNTER — APPOINTMENT (OUTPATIENT)
Dept: ORTHOPEDIC SURGERY | Facility: CLINIC | Age: 28
End: 2023-06-22

## 2023-06-22 NOTE — ASSESSMENT
[FreeTextEntry1] : MADDI SANTOS is a 27 year old female with right wrist pain.    \par I discussed with the patient that their symptoms, signs, and imaging are most consistent with  **. \par We reviewed the natural history of this condition and treatment options.\par We agreed on the following plan:\par \par Encouraged to continue home exercises per handout.\par Continue physical therapy.\par Recommend 150 min per week of moderate intensity aerobic activity \par Medication:    prescription provided.\par Follow up in 6-8 weeks.

## 2023-06-22 NOTE — PHYSICAL EXAM
[de-identified] : General: Well-nourished, well-developed, alert, and in no acute distress.\par Head: Normocephalic.\par Eyes: Pupils equal, extraocular muscles intact, normal sclera.\par Nose: No nasal discharge.\par Cardiovascular: Extremities are warm and well perfused. Distal pulses are symmetric bilaterally.\par Respiratory: No labored breathing.\par Extremities: Sensation is intact distally bilaterally. Distal pulses are symmetric bilaterally\par Lymphatic: No regional lymphadenopathy, no lymphedema\par Neurologic: No focal deficits\par Skin: Normal skin color, texture, and turgor\par Psychiatric: Normal affect \par \par MSK:\par Examination of right hand and wrist:\par Inspection: no erythema, ecchymosis, deformity, atrophy, scars, skin or nail changes. \par Tender to palpation: \par Nontender to palpation: scaphoid, scapholunate ligament, lunotriquetral ligament,  pisotriquetral joint, hook of hamate, CMC joint, dorsal wrist compartments, flexor retinaculum, radial ulnar joint or TFCC \par ROM: full wrist flexion 90 deg, wrist extension 70 deg, ulnar deviation 50 deg, radial deviation 20 deg, MCP flex 90 deg, pronation 90 deg, supination 90 deg, finger abd, finger add\par Pain at end of ROM\par \par Special tests:\par 1st CMC grind negative\par Finkelstein's negative\par TFCC compression negative\par Ng negative\par Froment negative\par Tinel negative\par Durkan negative\par Phalen negative\par Jef negative\par Thumbs up (radial), fist (median), peace (ulnar), A-OK (AIN inn [employer]) intact\par \par Examination of   hand and wrist:\par Inspection: no erythema, ecchymosis, deformity, atrophy, scars, skin or nail changes. \par Nontender to palpation: scaphoid, scapholunate ligament, lunotriquetral ligament,  pisotriquetral joint, hook of hamate, CMC joint, dorsal wrist compartments, flexor retinaculum, radial ulnar joint or TFCC \par ROM: full wrist flexion 90 deg, wrist extension 70 deg, ulnar deviation 50 deg, radial deviation 20 deg, MCP flex 90 deg, pronation 90 deg, supination 90 deg, finger abd, finger add\par Pain at end of ROM\par \par Sensation is intact to light touch over the axillary, musculocutaneous, median, radial, and ulnar nerve distributions bilaterally. Capillary refill is less than two seconds. Radial pulses 2+ equal bilaterally. Strength testing shows 5/5 abduction, 5/5 external rotation, 5/5 internal rotation, 5/5 biceps, 5/5 triceps. 5/5 wrist extension, 5/5 intrinsics. \par Reflexes 2+ biceps, brachioradialis. Vasquez negative.  [de-identified] : MRI right wrist (6/15/23):\par Extensor carpi ulnaris tendinosis, partial tear, and tenosynovitis with adjacent soft tissue edema.\par Dorsal soft tissue ganglion.\par Unremarkable triangular fibrocartilage.

## 2023-06-22 NOTE — HISTORY OF PRESENT ILLNESS
[de-identified] : MADDI SANTOS is a 27 year old female presents to follow up right wrist pain.\par Last visit was 5/31/23 at which time patient was referred for MRI, provided with wrist brace, advised to start home exercises, OT and take Meloxicam prn.\par MRI detailed below.\par States pain has\par

## 2023-06-23 ENCOUNTER — APPOINTMENT (OUTPATIENT)
Dept: ORTHOPEDIC SURGERY | Facility: CLINIC | Age: 28
End: 2023-06-23

## 2023-06-27 ENCOUNTER — APPOINTMENT (OUTPATIENT)
Dept: ORTHOPEDIC SURGERY | Facility: CLINIC | Age: 28
End: 2023-06-27
Payer: MEDICAID

## 2023-06-27 VITALS
HEIGHT: 63 IN | HEART RATE: 90 BPM | WEIGHT: 270 LBS | SYSTOLIC BLOOD PRESSURE: 106 MMHG | DIASTOLIC BLOOD PRESSURE: 69 MMHG | BODY MASS INDEX: 47.84 KG/M2 | OXYGEN SATURATION: 99 %

## 2023-06-27 DIAGNOSIS — M67.431 GANGLION, RIGHT WRIST: ICD-10-CM

## 2023-06-27 PROCEDURE — 99213 OFFICE O/P EST LOW 20 MIN: CPT

## 2023-06-27 NOTE — HISTORY OF PRESENT ILLNESS
[de-identified] : MADDI SANTOS is a 27 year old female presents to follow up right wrist pain.\par Last visit was 5/31/23 at which time patient was referred for MRI, provided a brace, advised to start home exercises, OT and take Meloxicam.\par States pain has not improved. Wearing brace and taking Meloxicam provides some relief of symptoms.  She has not started home exercises or OT.\par MRI detailed below.\par

## 2023-06-27 NOTE — PHYSICAL EXAM
[de-identified] : General: Well-nourished, well-developed, alert, and in no acute distress.\par Head: Normocephalic.\par Eyes: Pupils equal, extraocular muscles intact, normal sclera.\par Nose: No nasal discharge.\par Cardiovascular: Extremities are warm and well perfused. Distal pulses are symmetric bilaterally.\par Respiratory: No labored breathing.\par Extremities: Sensation is intact distally bilaterally. Distal pulses are symmetric bilaterally\par Lymphatic: No regional lymphadenopathy, no lymphedema\par Neurologic: No focal deficits\par Skin: Normal skin color, texture, and turgor\par Psychiatric: Normal affect \par \par MSK:\par Examination of right hand and wrist:\par Inspection: \par Mild effusion\par No erythema, ecchymosis, deformity, atrophy, scars, skin or nail changes. \par Tender to palpation: 6th dorsal wrist compartment, ulnar styloid\par Nontender to palpation: scaphoid, scapholunate ligament, lunotriquetral ligament,  pisotriquetral joint, hook of hamate, CMC joint, flexor retinaculum \par ROM: full wrist flexion 90 deg, wrist extension 50 deg, ulnar deviation 50 deg, radial deviation 20 deg, MCP flex 90 deg, pronation 90 deg, supination 90 deg\par Pain at end of extension and flexion\par \par Special tests:\par 1st CMC grind negative\par Finkelstein's negative\par Ng negative\par Tinel negative\par Durkan negative\par Phalen negative\par \par Thumbs up (radial), fist (median), peace (ulnar), A-OK (AIN inn [employer]) intact\par \par Sensation is intact to light touch over the axillary, musculocutaneous, median, radial, and ulnar nerve distributions bilaterally. Capillary refill is less than two seconds. Radial pulses 2+ equal bilaterally. Strength testing shows 5/5 abduction, 5/5 external rotation, 5/5 internal rotation, 5/5 biceps, 5/5 triceps. 5/5 wrist extension, 5/5 intrinsics. \par Reflexes 2+ biceps, brachioradialis. Vasquez negative.  [de-identified] : MRI right wrist  (6/15/23): \par Extensor carpi ulnaris tendinosis, partial tear and tenosynovitis with adjacent soft tissue edema.\par Dorsal soft tissue ganglion originating from radiocarpal joint capsule measuring 1.1 x 0.6  x 1.3 cm.\par Unremarkable triangular fibrocartilage. \par Normal caliber of median and ulnar nerves.

## 2023-06-27 NOTE — ASSESSMENT
[FreeTextEntry1] : MADDI SANTOS is a 27 year old female with right wrist pain.    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 Contact information provided.\par Medication: Etodolac 400mg BID prn prescription provided.\par Work documentation completed.\par Follow up as needed.

## 2023-06-29 ENCOUNTER — APPOINTMENT (OUTPATIENT)
Dept: ORTHOPEDIC SURGERY | Facility: CLINIC | Age: 28
End: 2023-06-29
Payer: MEDICAID

## 2023-06-29 VITALS — WEIGHT: 293 LBS | RESPIRATION RATE: 16 BRPM | BODY MASS INDEX: 51.91 KG/M2 | HEIGHT: 63 IN

## 2023-06-29 PROCEDURE — 20600 DRAIN/INJ JOINT/BURSA W/O US: CPT

## 2023-06-29 PROCEDURE — 99203 OFFICE O/P NEW LOW 30 MIN: CPT | Mod: 25

## 2023-06-29 PROCEDURE — 20550 NJX 1 TENDON SHEATH/LIGAMENT: CPT | Mod: RT

## 2023-06-29 PROCEDURE — 73110 X-RAY EXAM OF WRIST: CPT | Mod: 50

## 2023-08-13 ENCOUNTER — APPOINTMENT (OUTPATIENT)
Dept: AFTER HOURS CARE | Facility: EMERGENCY ROOM | Age: 28
End: 2023-08-13
Payer: MEDICAID

## 2023-08-13 PROCEDURE — 99213 OFFICE O/P EST LOW 20 MIN: CPT | Mod: 95

## 2023-08-13 PROCEDURE — 99203 OFFICE O/P NEW LOW 30 MIN: CPT | Mod: 95

## 2023-08-13 NOTE — ASSESSMENT
[FreeTextEntry1] : reassuring exam, seems like known issue, no suspicion of joint infection or neurovasc compromise

## 2023-08-13 NOTE — PHYSICAL EXAM
[de-identified] : R upper extremity exam: There is no gross visible bony deformity. mild asymmetric soft tissue swelling of wrist. Full ROM. nl strength and sensation to physician-guided self exam. Brisk cap refill.  Skin intact.

## 2023-08-13 NOTE — PLAN
[No new medications perscribed] : Treat in place: No new medications prescribed [FreeTextEntry1] : can take meloxicam taht she has on hand add tylenol work note stressed importance of hand f/u at the end of this week ED precautions

## 2023-08-13 NOTE — HISTORY OF PRESENT ILLNESS
[Home] : at home, [unfilled] , at the time of the visit. [Other Location: e.g. Home (Enter Location, City,State)___] : at [unfilled] [Verbal consent obtained from patient] : the patient, [unfilled] [FreeTextEntry8] : 27y F hx mult issues going on with R wrist incl carpal tunnel, dequervains, tendonitis, ganglion cyst, TFCC injury now p/w 1wk but worse today of pain, unable to go to work, R wrist swelling. No clear provocation, redness, fever, motor/sensory changes. Has an appointment with Ortho/hand but not until the end of the week.

## 2023-08-18 ENCOUNTER — APPOINTMENT (OUTPATIENT)
Dept: ORTHOPEDIC SURGERY | Facility: CLINIC | Age: 28
End: 2023-08-18
Payer: MEDICAID

## 2023-08-18 VITALS — RESPIRATION RATE: 16 BRPM | BODY MASS INDEX: 51.91 KG/M2 | HEIGHT: 63 IN | WEIGHT: 293 LBS

## 2023-08-18 PROCEDURE — 99213 OFFICE O/P EST LOW 20 MIN: CPT

## 2023-08-18 NOTE — HISTORY OF PRESENT ILLNESS
[FreeTextEntry1] : The patient is following back up with me regarding her right wrist pain. The patient notes that the prior injections helped her for several weeks which she feels like her symptoms have recurred after starting work at [employer], working as a .  She notes that they are not as bad as they were before.  Roughly 6 weeks ago, she was noted to have de Quervain's tenosynovitis as well as a stable TFCC sprain/partial tear, possibly ECU tendinosis.  I recommended full-time splint wear and activity modification after both corticosteroid injections for her de Quervain's and TFCC were administered at that visit. I also noted she most likely has right carpal tunnel syndrome, for which I recommended nighttime splint wear and activity modification.

## 2023-08-18 NOTE — DISCUSSION/SUMMARY
[FreeTextEntry1] : I had a lengthy discussion with the patient today regarding her residual symptoms.  Given the or improved relative to last visit, although not completely to the point where she like to be, I recommended continued conservative treatment with activity modification, anti-inflammatory medication and rest/immobilization.  I also recommended she follow-up with a rheumatologist given suspicion for inflammatory disease.  She has a history of this as a child and presents with a Maller rash on the face.  She will plan to follow back up with me in 6 weeks if no significant improvement is noted.

## 2023-08-18 NOTE — PHYSICAL EXAM
[de-identified] : The wrists have symmetric range of motion bilaterally. Less tenderover the right first dorsal compartment and over the TFCC. Negative Finkelstein test. Nontender over the right DRUJ, no instability. Negative Synergy test but no tenderness along the ECU, including its insertion. No ECU subluxation. There is no tenderness over the scaphoid, scapholunate, or lunotriquetral ligaments bilaterally. There is a negative Ng's test bilaterally. There is no tenderness over the pisotriquetral joint, hamate hook, or CMC joints bilaterally. The patient is nontender over both scaphoids and anatomic snuffbox is bilaterally. There is no clubbing cyanosis or edema.  Full, symmetric digital ROM.  There is good capillary refill of the digits bilaterally. There is full 5/5 strength at the right APB, FDI and ADM. Sensation is intact to light touch bilaterally.

## 2023-09-11 ENCOUNTER — RX RENEWAL (OUTPATIENT)
Age: 28
End: 2023-09-11

## 2023-09-11 RX ORDER — MELOXICAM 15 MG/1
15 TABLET ORAL DAILY
Qty: 30 | Refills: 1 | Status: ACTIVE | COMMUNITY
Start: 2023-05-31 | End: 1900-01-01

## 2023-09-13 ENCOUNTER — LABORATORY RESULT (OUTPATIENT)
Age: 28
End: 2023-09-13

## 2023-09-13 ENCOUNTER — APPOINTMENT (OUTPATIENT)
Dept: RHEUMATOLOGY | Facility: CLINIC | Age: 28
End: 2023-09-13
Payer: MEDICAID

## 2023-09-13 VITALS
TEMPERATURE: 98.3 F | OXYGEN SATURATION: 92 % | HEART RATE: 88 BPM | HEIGHT: 63 IN | BODY MASS INDEX: 51.91 KG/M2 | WEIGHT: 293 LBS

## 2023-09-13 DIAGNOSIS — M79.642 PAIN IN RIGHT HAND: ICD-10-CM

## 2023-09-13 DIAGNOSIS — M79.641 PAIN IN RIGHT HAND: ICD-10-CM

## 2023-09-13 DIAGNOSIS — M25.561 PAIN IN RIGHT KNEE: ICD-10-CM

## 2023-09-13 DIAGNOSIS — M25.562 PAIN IN RIGHT KNEE: ICD-10-CM

## 2023-09-13 DIAGNOSIS — G89.29 PAIN IN RIGHT KNEE: ICD-10-CM

## 2023-09-13 DIAGNOSIS — R21 RASH AND OTHER NONSPECIFIC SKIN ERUPTION: ICD-10-CM

## 2023-09-13 PROCEDURE — 99204 OFFICE O/P NEW MOD 45 MIN: CPT

## 2023-09-13 RX ORDER — DICLOFENAC SODIUM 20 MG/G
2 SOLUTION TOPICAL
Qty: 1 | Refills: 11 | Status: ACTIVE | COMMUNITY
Start: 2023-09-13 | End: 1900-01-01

## 2023-09-13 RX ORDER — DICLOFENAC SODIUM 75 MG/1
75 TABLET, DELAYED RELEASE ORAL
Qty: 60 | Refills: 1 | Status: ACTIVE | COMMUNITY
Start: 2023-09-13 | End: 1900-01-01

## 2023-09-18 ENCOUNTER — NON-APPOINTMENT (OUTPATIENT)
Age: 28
End: 2023-09-18

## 2023-09-18 LAB
ALBUMIN MFR SERPL ELPH: 55.2 %
ALBUMIN SERPL ELPH-MCNC: 4.6 G/DL
ALBUMIN SERPL-MCNC: 4.4 G/DL
ALBUMIN/GLOB SERPL: 1.3 RATIO
ALP BLD-CCNC: 99 U/L
ALPHA1 GLOB MFR SERPL ELPH: 3.9 %
ALPHA1 GLOB SERPL ELPH-MCNC: 0.3 G/DL
ALPHA2 GLOB MFR SERPL ELPH: 8.8 %
ALPHA2 GLOB SERPL ELPH-MCNC: 0.7 G/DL
ALT SERPL-CCNC: 25 U/L
ANA SER IF-ACNC: NEGATIVE
ANION GAP SERPL CALC-SCNC: 15 MMOL/L
APPEARANCE: CLEAR
AST SERPL-CCNC: 24 U/L
B-GLOBULIN MFR SERPL ELPH: 14.6 %
B-GLOBULIN SERPL ELPH-MCNC: 1.2 G/DL
B2 GLYCOPROT1 IGG SER-ACNC: <5 SGU
B2 GLYCOPROT1 IGM SER-ACNC: 6.2 SMU
BASOPHILS # BLD AUTO: 0.04 K/UL
BASOPHILS NFR BLD AUTO: 0.5 %
BILIRUB SERPL-MCNC: <0.2 MG/DL
BILIRUBIN URINE: NEGATIVE
BLOOD URINE: ABNORMAL
BUN SERPL-MCNC: 13 MG/DL
C3 SERPL-MCNC: 162 MG/DL
C4 SERPL-MCNC: 35 MG/DL
CALCIUM SERPL-MCNC: 9.6 MG/DL
CARDIOLIPIN IGM SER-MCNC: 7.4 MPL
CARDIOLIPIN IGM SER-MCNC: <5 GPL
CCP AB SER IA-ACNC: <8 UNITS
CHLORIDE SERPL-SCNC: 101 MMOL/L
CK SERPL-CCNC: 135 U/L
CO2 SERPL-SCNC: 22 MMOL/L
COLOR: YELLOW
CREAT SERPL-MCNC: 0.8 MG/DL
CREAT SPEC-SCNC: 42 MG/DL
CREAT/PROT UR: <0.1 RATIO
CRP SERPL-MCNC: 18.3 MG/L
DSDNA AB SER-ACNC: <12 IU/ML
EGFR: 103 ML/MIN/1.73M2
ENA RNP AB SER IA-ACNC: <0.2 AL
ENA SCL70 IGG SER IA-ACNC: <0.2 AL
ENA SM AB SER IA-ACNC: <0.2 AL
ENA SS-A AB SER IA-ACNC: <0.2 AL
ENA SS-B AB SER IA-ACNC: <0.2 AL
EOSINOPHIL # BLD AUTO: 0.16 K/UL
EOSINOPHIL NFR BLD AUTO: 1.9 %
ERYTHROCYTE [SEDIMENTATION RATE] IN BLOOD BY WESTERGREN METHOD: 15 MM/HR
GAMMA GLOB FLD ELPH-MCNC: 1.4 G/DL
GAMMA GLOB MFR SERPL ELPH: 17.5 %
GLUCOSE QUALITATIVE U: NEGATIVE MG/DL
GLUCOSE SERPL-MCNC: 85 MG/DL
HBV CORE IGG+IGM SER QL: NONREACTIVE
HBV SURFACE AG SER QL: NONREACTIVE
HCT VFR BLD CALC: 43.6 %
HCV AB SER QL: NONREACTIVE
HCV S/CO RATIO: 0.2 S/CO
HGB BLD-MCNC: 14.3 G/DL
IMM GRANULOCYTES NFR BLD AUTO: 0.4 %
INTERPRETATION SERPL IEP-IMP: NORMAL
KETONES URINE: NEGATIVE MG/DL
LEUKOCYTE ESTERASE URINE: NEGATIVE
LYMPHOCYTES # BLD AUTO: 2.5 K/UL
LYMPHOCYTES NFR BLD AUTO: 29.7 %
M PROTEIN SPEC IFE-MCNC: NORMAL
MAN DIFF?: NORMAL
MCHC RBC-ENTMCNC: 28.5 PG
MCHC RBC-ENTMCNC: 32.8 G/DL
MCV RBC AUTO: 86.9 FL
MONOCYTES # BLD AUTO: 0.67 K/UL
MONOCYTES NFR BLD AUTO: 7.9 %
NEUTROPHILS # BLD AUTO: 5.03 K/UL
NEUTROPHILS NFR BLD AUTO: 59.6 %
NITRITE URINE: NEGATIVE
PH URINE: 6
PLATELET # BLD AUTO: 305 K/UL
POTASSIUM SERPL-SCNC: 4.4 MMOL/L
PROT SERPL-MCNC: 7.7 G/DL
PROT SERPL-MCNC: 7.9 G/DL
PROT SERPL-MCNC: 7.9 G/DL
PROT UR-MCNC: <5 MG/DLG/24H
PROTEIN URINE: NEGATIVE MG/DL
RBC # BLD: 5.02 M/UL
RBC # FLD: 13.6 %
RF+CCP IGG SER-IMP: NEGATIVE
RHEUMATOID FACT SER QL: <10 IU/ML
RNA POLYMERASE III IGG: 7 UNITS
SODIUM SERPL-SCNC: 138 MMOL/L
SPECIFIC GRAVITY URINE: 1.01
T4 FREE SERPL-MCNC: 0.9 NG/DL
TSH SERPL-ACNC: 3.09 UIU/ML
UROBILINOGEN URINE: 0.2 MG/DL
WBC # FLD AUTO: 8.43 K/UL

## 2023-09-27 ENCOUNTER — APPOINTMENT (OUTPATIENT)
Dept: ORTHOPEDIC SURGERY | Facility: CLINIC | Age: 28
End: 2023-09-27
Payer: MEDICAID

## 2023-09-27 DIAGNOSIS — R79.82 ELEVATED C-REACTIVE PROTEIN (CRP): ICD-10-CM

## 2023-09-27 DIAGNOSIS — S69.81XA OTHER SPECIFIED INJURIES OF RIGHT WRIST, HAND AND FINGER(S), INITIAL ENCOUNTER: ICD-10-CM

## 2023-09-27 PROCEDURE — 99213 OFFICE O/P EST LOW 20 MIN: CPT

## 2023-11-04 ENCOUNTER — APPOINTMENT (OUTPATIENT)
Dept: AFTER HOURS CARE | Facility: EMERGENCY ROOM | Age: 28
End: 2023-11-04

## 2023-12-06 ENCOUNTER — APPOINTMENT (OUTPATIENT)
Dept: NEUROLOGY | Facility: CLINIC | Age: 28
End: 2023-12-06
Payer: MEDICAID

## 2023-12-06 VITALS
BODY MASS INDEX: 51.91 KG/M2 | HEIGHT: 63 IN | DIASTOLIC BLOOD PRESSURE: 83 MMHG | WEIGHT: 293 LBS | TEMPERATURE: 97.8 F | SYSTOLIC BLOOD PRESSURE: 118 MMHG | OXYGEN SATURATION: 95 % | HEART RATE: 100 BPM

## 2023-12-06 DIAGNOSIS — M77.8 OTHER ENTHESOPATHIES, NOT ELSEWHERE CLASSIFIED: ICD-10-CM

## 2023-12-06 PROCEDURE — 99203 OFFICE O/P NEW LOW 30 MIN: CPT

## 2023-12-11 PROBLEM — M77.8 EXTENSOR CARPI ULNARIS TENDINITIS: Status: ACTIVE | Noted: 2023-06-27

## 2024-02-28 ENCOUNTER — APPOINTMENT (OUTPATIENT)
Dept: NEUROLOGY | Facility: CLINIC | Age: 29
End: 2024-02-28
Payer: MEDICAID

## 2024-02-28 VITALS
HEIGHT: 63 IN | WEIGHT: 293 LBS | HEART RATE: 99 BPM | BODY MASS INDEX: 51.91 KG/M2 | OXYGEN SATURATION: 97 % | TEMPERATURE: 98.4 F

## 2024-02-28 DIAGNOSIS — M25.531 PAIN IN RIGHT WRIST: ICD-10-CM

## 2024-02-28 DIAGNOSIS — M65.4 RADIAL STYLOID TENOSYNOVITIS [DE QUERVAIN]: ICD-10-CM

## 2024-02-28 PROCEDURE — 95910 NRV CNDJ TEST 7-8 STUDIES: CPT

## 2024-02-28 NOTE — PROCEDURE
[FreeTextEntry1] : Nerve Conduction and Electromyography Report [FreeTextEntry3] : Electro Physiologic Findings:  Limb temperature was monitored and maintained at approximately 32 - 36 C in the upper extremities.  The median sensory and motor responses were normal bilaterally and symmetric. The left radial sensory response was normal. The lumbrical studies were negative bilaterally.   Clinical Electrophysiological Impression:   This electrodiagnostic study was unremarkable. There was no evidence of polyneuropathy in the upper extremities, or median nerve entrapment on either side.

## 2024-02-29 ENCOUNTER — TRANSCRIPTION ENCOUNTER (OUTPATIENT)
Age: 29
End: 2024-02-29

## 2024-03-01 ENCOUNTER — TRANSCRIPTION ENCOUNTER (OUTPATIENT)
Age: 29
End: 2024-03-01

## 2024-03-08 ENCOUNTER — APPOINTMENT (OUTPATIENT)
Dept: ORTHOPEDIC SURGERY | Facility: CLINIC | Age: 29
End: 2024-03-08

## 2024-03-15 ENCOUNTER — APPOINTMENT (OUTPATIENT)
Dept: ORTHOPEDIC SURGERY | Facility: CLINIC | Age: 29
End: 2024-03-15

## 2024-04-06 ENCOUNTER — APPOINTMENT (OUTPATIENT)
Dept: AFTER HOURS CARE | Facility: EMERGENCY ROOM | Age: 29
End: 2024-04-06
Payer: MEDICAID

## 2024-04-06 ENCOUNTER — NON-APPOINTMENT (OUTPATIENT)
Age: 29
End: 2024-04-06

## 2024-04-06 DIAGNOSIS — J02.9 ACUTE PHARYNGITIS, UNSPECIFIED: ICD-10-CM

## 2024-04-06 PROCEDURE — 99214 OFFICE O/P EST MOD 30 MIN: CPT

## 2024-04-06 RX ORDER — METHYLPREDNISOLONE 4 MG/1
4 TABLET ORAL
Qty: 1 | Refills: 0 | Status: ACTIVE | COMMUNITY
Start: 2024-04-06 | End: 1900-01-01

## 2024-04-06 RX ORDER — AMOXICILLIN 500 MG/1
500 CAPSULE ORAL 3 TIMES DAILY
Qty: 21 | Refills: 0 | Status: ACTIVE | COMMUNITY
Start: 2024-04-06 | End: 1900-01-01

## 2024-04-06 NOTE — ASSESSMENT
[FreeTextEntry1] : On virtual exam patient awake and alert no acute distress no facial asymmetry no drooling airway patent Impression: Acute pharyngitis

## 2024-04-06 NOTE — REVIEW OF SYSTEMS
[Fever] : no fever [Chills] : no chills [Vision Problems] : no vision problems [Earache] : no earache [Hoarseness] : no hoarseness [Nasal Discharge] : no nasal discharge [Sore Throat] : sore throat [Postnasal Drip] : no postnasal drip [Chest Pain] : no chest pain [Shortness Of Breath] : no shortness of breath [Abdominal Pain] : no abdominal pain [Vomiting] : no vomiting

## 2024-04-06 NOTE — PLAN
[FreeTextEntry1] : Will start on amoxicillin and Medrol Dosepak Instructions and precautions reviewed Patient understands to go for in person evaluation if not improving over the next 24 to 48 hours and if change in voice occurs or if increasing difficulty swallowing or drooling to go be seen immediately Patient to continue increase fluids and soft diet as tolerated

## 2024-04-06 NOTE — HISTORY OF PRESENT ILLNESS
[Home] : at home, [unfilled] , at the time of the visit. [Other Location: e.g. Home (Enter Location, City,State)___] : at [unfilled] [Verbal consent obtained from patient] : the patient, [unfilled] [FreeTextEntry8] : 28-year-old female with history of bipolar disorder and PCOS complaining of sore throat times last 1 week which she states is mostly on her right side of her throat.  Patient with prior history of tonsillectomy and states that she notes the right side appears to be "puffy" and red.  Patient denies any associated fever or chills, change in voice, drooling, chest pain or shortness of breath.  Patient has been taking ibuprofen with minimal relief of pain.  Patient denies any facial or neck swelling, tooth ache or tongue/uvula swelling

## 2024-04-06 NOTE — PHYSICAL EXAM
[No Acute Distress] : no acute distress [Well Nourished] : well nourished [Well Developed] : well developed [Well-Appearing] : well-appearing [Normal Outer Ear/Nose] : the outer ears and nose were normal in appearance [Normal Sclera/Conjunctiva] : normal sclera/conjunctiva [No Rash] : no rash [No Focal Deficits] : no focal deficits [Normal Affect] : the affect was normal [Normal Insight/Judgement] : insight and judgment were intact [de-identified] : Airway patent, no drooling, no stridor [de-identified] : No asymmetry

## 2024-04-18 ENCOUNTER — APPOINTMENT (OUTPATIENT)
Dept: RHEUMATOLOGY | Facility: CLINIC | Age: 29
End: 2024-04-18

## 2024-05-07 ENCOUNTER — NON-APPOINTMENT (OUTPATIENT)
Age: 29
End: 2024-05-07

## 2024-06-03 ENCOUNTER — NON-APPOINTMENT (OUTPATIENT)
Age: 29
End: 2024-06-03

## 2024-06-05 ENCOUNTER — APPOINTMENT (OUTPATIENT)
Dept: PULMONOLOGY | Facility: CLINIC | Age: 29
End: 2024-06-05

## 2024-09-03 ENCOUNTER — TRANSCRIPTION ENCOUNTER (OUTPATIENT)
Age: 29
End: 2024-09-03

## 2024-09-13 ENCOUNTER — APPOINTMENT (OUTPATIENT)
Dept: ORTHOPEDIC SURGERY | Facility: CLINIC | Age: 29
End: 2024-09-13
Payer: MEDICAID

## 2024-09-13 DIAGNOSIS — M79.642 PAIN IN RIGHT HAND: ICD-10-CM

## 2024-09-13 DIAGNOSIS — M25.531 PAIN IN RIGHT WRIST: ICD-10-CM

## 2024-09-13 DIAGNOSIS — R22.31 LOCALIZED SWELLING, MASS AND LUMP, RIGHT UPPER LIMB: ICD-10-CM

## 2024-09-13 DIAGNOSIS — M79.641 PAIN IN RIGHT HAND: ICD-10-CM

## 2024-09-13 DIAGNOSIS — M25.532 PAIN IN RIGHT WRIST: ICD-10-CM

## 2024-09-13 PROCEDURE — 99214 OFFICE O/P EST MOD 30 MIN: CPT

## 2024-09-13 RX ORDER — DICLOFENAC SODIUM 100 MG/1
100 TABLET, FILM COATED, EXTENDED RELEASE ORAL DAILY
Qty: 30 | Refills: 0 | Status: ACTIVE | COMMUNITY
Start: 2024-09-13 | End: 1900-01-01

## 2024-09-13 NOTE — HISTORY OF PRESENT ILLNESS
[FreeTextEntry1] : The patient is presenting for follow up in regard to a new concern regarding her right forearm and wrist discomfort which started to flareup a few weeks ago. The patient notes that her pain is not constant but flare-up with overuse. The patient is taking care of her two toddlers and is constantly doing repetitive motions. The patient denies any traumatic inciting event.  The patient also notes a soft tissue mass over the dorsal aspect of the right wrist a few weeks ago.

## 2024-09-13 NOTE — ASSESSMENT
[FreeTextEntry1] : My impression is that the patient has right worse than left tennis elbow. Initial treatment options were discussed. I explained this is likely from overuse and initial treatment entails dedicated, consistent therapy and home exercises to passively stretch his right forearm musculature. I also recommended activity modification, minimizing heavy lifting and loading of his right upper extremity as well as anti-inflammatory medication. I explained that it may take several weeks, sometimes months to notice clinically significant improvement. I discussed treatment options if therapy does not provide significant relief, including injections and possibly surgery. I also recommended counterforce brace wear to offload the mobile wad. All questions were answered. The patient was well in accordance with the plan. They will plan to follow up with me in 6-8 weeks if no significant improvement in symptoms is noted.  I had a lengthy discussion with the patient regarding the etiology of the patient's right dorsal wrist mass, likely being a ganglion cyst. Both nonoperative and operative treatment options were discussed, at length. The risks and benefits of each were communicated. I explained that management of the mass, likely a ganglion cyst, includes symptomatic management and avoidance of aggravating factors (rest, immobilization), aspiration or surgical excision. I explained that aspiration holds a higher recurrence rate than surgery, however, it is less invasive. I explained that surgical excision is associated with the lowest risk of recurrence and provides a definitive diagnosis after the specimen is sent to pathology. I explained that the likelihood of a malignant lesion involving the hand/wrist is very low but not 0% and this can only be confirmed with a surgical excision/biopsy. Surgical risks are discussed included infection, wound healing issues, loss of motion, stiffness, nerve injury, numbness and tingling, electrical-burning pain, tendon rupture, postoperative pain, CRPS, swelling, recurrence of the mass and the possibility of revision surgery or reoperation, especially if malignant findings are noted on biopsy, which would warrant a larger procedure including wide excision. The patient opted for an ultrasound guided assessment and aspiration of the soft tissue mass. A script was provided for the patient today.     Vaccine status unknown

## 2024-09-13 NOTE — PHYSICAL EXAM
[de-identified] : Physical exam demonstrates the patient to be alert and oriented x 3 and capable of ambulation. The patient is well-developed and well-nourished in no apparent respiratory distress. The majority of the skin is intact bilaterally in the upper extremities without any bilateral elbow lymphadenopathy.  Evaluation of both elbows reveals full symmetric range of motion from full extension to 140 of flexion with full pronation and full supination. Tender over the lateral epicondyle bilaterally, more symptomatic on the right side. There is discomfort with passive wrist flexion against resistance while keeping the elbow extended. Biceps and triceps intact with excellent strength bilaterally.   The wrists have symmetric range of motion bilaterally. There is a soft tissue mass over the dorsal aspect of the right wrist. No overlying skin changes or signs of infection over the mass. The mass does not move with active flexion and extension of the right hand digits. There is mild tenderness to palpation over the right first dorsal compartment. There is no tenderness over the scaphoid, scapholunate, or lunotriquetral ligaments bilaterally. There is a negative Ng's test bilaterally. There is no tenderness over the distal radial ulnar joint or TFCC and no evidence of instability bilaterally. There is no tenderness over the pisotriquetral joint, hamate hook, or CMC joints bilaterally. The patient is nontender over both scaphoids and anatomic snuffbox is bilaterally. There is no clubbing cyanosis or edema.  Full, symmetric digital range of motion.   There is good capillary refill of the digits bilaterally. Sensation is intact to light touch bilaterally.

## 2024-10-14 NOTE — ASSESSMENT
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 \par We agreed on the following plan:\par \par Xray from 5/29/23 reviewed with patient today.\par Wrist brace provided today.\par Activity modification\par Occupational therapy. Referral provided.\par Medication: Meloxicam 15mg daily prn prescription provided.\par Discussed ergonomic work space design, routine breaks with stretching, awareness of hand and wrist postures when using mobile devices, reading etc.\par Advanced imaging: MRI\par Follow up after imaging.\par \par  hard copy, drawn during this pregnancy

## 2024-10-21 ENCOUNTER — TRANSCRIPTION ENCOUNTER (OUTPATIENT)
Age: 29
End: 2024-10-21

## 2024-11-08 ENCOUNTER — APPOINTMENT (OUTPATIENT)
Dept: ORTHOPEDIC SURGERY | Facility: CLINIC | Age: 29
End: 2024-11-08